# Patient Record
Sex: FEMALE | Race: WHITE | URBAN - METROPOLITAN AREA
[De-identification: names, ages, dates, MRNs, and addresses within clinical notes are randomized per-mention and may not be internally consistent; named-entity substitution may affect disease eponyms.]

---

## 2022-03-19 PROBLEM — J30.9 ALLERGIC RHINITIS: Status: ACTIVE | Noted: 2019-09-16

## 2022-03-19 PROBLEM — G47.10 HYPERSOMNIA: Status: ACTIVE | Noted: 2019-09-16

## 2022-04-19 PROBLEM — N92.6 IRREGULAR MENSES: Status: ACTIVE | Noted: 2022-04-19

## 2023-11-02 ENCOUNTER — TELEPHONE (OUTPATIENT)
Dept: OBGYN CLINIC | Age: 53
End: 2023-11-02

## 2023-11-02 NOTE — TELEPHONE ENCOUNTER
Patient called to schedule a well woman visit on 12/13/2023 with Dr. Stephanie St  and requested a refill on her progesterone medication up to her appointment date to be filled at [facility].   Thank you

## 2023-11-03 NOTE — TELEPHONE ENCOUNTER
Orders Placed This Encounter    norethindrone (AYGESTIN) 5 MG tablet     Sig: Take 2 tablets by mouth daily     Dispense:  30 tablet     Refill:  2

## 2023-12-27 NOTE — PROGRESS NOTES
HPI: Ms. Randy Llanos is a 47 y.o. female who is here today for a well woman exam. She complains of none    Allergies   Allergen Reactions    Latex Other (See Comments)     Skin irritation       Current Outpatient Medications   Medication Sig Dispense Refill    VITAMIN D PO Take by mouth      norethindrone (AYGESTIN) 5 MG tablet Take 2 tablets by mouth daily 180 tablet 4    DULoxetine (CYMBALTA) 60 MG extended release capsule Take 1 capsule by mouth every evening      propranolol (INDERAL) 80 MG tablet Take 1 tablet by mouth every evening       No current facility-administered medications for this visit.        Past Medical History:   Diagnosis Date    Anxiety     Depression     Diabetes ([address])     type II, checks 3x/week, avg 125    GERD (gastroesophageal reflux disease)     Hypercholesterolemia     Miscarriage     MAB x 1, SAB x1    Morbid obesity (HCC)     Nausea & vomiting     Neuropathy     Sleep apnea     cpap use     Past Surgical History:   Procedure Laterality Date    ANKLE FRACTURE SURGERY Left      SECTION      x2    CHOLECYSTECTOMY      DILATION AND CURETTAGE OF UTERUS      MAB    ENDOMETRIAL CRYOABLATION      GASTRECTOMY  9/14/15    sleeve    LAP,TUBAL CAUTERY      ORTHOPEDIC SURGERY      fractured ankle    ORTHOPEDIC SURGERY      fractured thumb    PELVIC LAPAROSCOPY      infertility    TUBAL LIGATION       Social History     Socioeconomic History    Marital status:      Spouse name: Not on file    Number of children: Not on file    Years of education: Not on file    Highest education level: Not on file   Occupational History    Not on file   Tobacco Use    Smoking status: Never    Smokeless tobacco: Never   Vaping Use    Vaping Use: Never used   Substance and Sexual Activity    Alcohol use: No    Drug use: No    Sexual activity: Yes     Partners: Male     Birth control/protection: Surgical     Comment: Tubal   Other Topics Concern    Not on file   Social History

## 2023-12-29 ENCOUNTER — OFFICE VISIT (OUTPATIENT)
Dept: OBGYN CLINIC | Age: 53
End: 2023-12-29
Payer: COMMERCIAL

## 2023-12-29 VITALS
HEIGHT: 64 IN | DIASTOLIC BLOOD PRESSURE: 64 MMHG | BODY MASS INDEX: 49.61 KG/M2 | WEIGHT: 290.6 LBS | SYSTOLIC BLOOD PRESSURE: 112 MMHG

## 2023-12-29 DIAGNOSIS — Z01.419 WELL WOMAN EXAM: Primary | ICD-10-CM

## 2023-12-29 DIAGNOSIS — Z13.89 SCREENING FOR GENITOURINARY CONDITION: ICD-10-CM

## 2023-12-29 LAB
BILIRUBIN, URINE, POC: NEGATIVE
BLOOD URINE, POC: NEGATIVE
GLUCOSE URINE, POC: NEGATIVE
KETONES, URINE, POC: NEGATIVE
LEUKOCYTE ESTERASE, URINE, POC: NEGATIVE
NITRITE, URINE, POC: NEGATIVE
PH, URINE, POC: 5 (ref 4.6–8)
PROTEIN,URINE, POC: NEGATIVE
SPECIFIC GRAVITY, URINE, POC: 1.01 (ref 1–1.03)
URINALYSIS CLARITY, POC: CLEAR
URINALYSIS COLOR, POC: YELLOW
UROBILINOGEN, POC: NORMAL

## 2023-12-29 PROCEDURE — 99396 PREV VISIT EST AGE 40-64: CPT | Performed by: OBSTETRICS & GYNECOLOGY

## 2023-12-29 PROCEDURE — 81002 URINALYSIS NONAUTO W/O SCOPE: CPT | Performed by: OBSTETRICS & GYNECOLOGY
